# Patient Record
Sex: MALE | Race: WHITE | NOT HISPANIC OR LATINO | Employment: OTHER | URBAN - METROPOLITAN AREA
[De-identification: names, ages, dates, MRNs, and addresses within clinical notes are randomized per-mention and may not be internally consistent; named-entity substitution may affect disease eponyms.]

---

## 2023-04-04 ENCOUNTER — OFFICE VISIT (OUTPATIENT)
Dept: UROLOGY | Facility: CLINIC | Age: 73
End: 2023-04-04
Payer: MEDICARE

## 2023-04-04 VITALS
DIASTOLIC BLOOD PRESSURE: 69 MMHG | SYSTOLIC BLOOD PRESSURE: 146 MMHG | WEIGHT: 315 LBS | HEIGHT: 72 IN | HEART RATE: 109 BPM | BODY MASS INDEX: 42.66 KG/M2

## 2023-04-04 DIAGNOSIS — N28.1 RENAL CYST: Primary | ICD-10-CM

## 2023-04-04 DIAGNOSIS — N20.0 KIDNEY STONES: ICD-10-CM

## 2023-04-04 DIAGNOSIS — N39.0 RECURRENT UTI: ICD-10-CM

## 2023-04-04 LAB — POC RESIDUAL URINE VOLUME: 0 ML (ref 0–100)

## 2023-04-04 PROCEDURE — 1159F MED LIST DOCD IN RCRD: CPT | Mod: CPTII,S$GLB,, | Performed by: UROLOGY

## 2023-04-04 PROCEDURE — 3288F PR FALLS RISK ASSESSMENT DOCUMENTED: ICD-10-PCS | Mod: CPTII,S$GLB,, | Performed by: UROLOGY

## 2023-04-04 PROCEDURE — 1159F PR MEDICATION LIST DOCUMENTED IN MEDICAL RECORD: ICD-10-PCS | Mod: CPTII,S$GLB,, | Performed by: UROLOGY

## 2023-04-04 PROCEDURE — 99999 PR PBB SHADOW E&M-NEW PATIENT-LVL III: ICD-10-PCS | Mod: PBBFAC,,, | Performed by: UROLOGY

## 2023-04-04 PROCEDURE — 99204 OFFICE O/P NEW MOD 45 MIN: CPT | Mod: S$GLB,,, | Performed by: UROLOGY

## 2023-04-04 PROCEDURE — 3008F BODY MASS INDEX DOCD: CPT | Mod: CPTII,S$GLB,, | Performed by: UROLOGY

## 2023-04-04 PROCEDURE — 99999 PR PBB SHADOW E&M-NEW PATIENT-LVL III: CPT | Mod: PBBFAC,,, | Performed by: UROLOGY

## 2023-04-04 PROCEDURE — 51798 US URINE CAPACITY MEASURE: CPT | Mod: S$GLB,,, | Performed by: UROLOGY

## 2023-04-04 PROCEDURE — 1125F AMNT PAIN NOTED PAIN PRSNT: CPT | Mod: CPTII,S$GLB,, | Performed by: UROLOGY

## 2023-04-04 PROCEDURE — 1101F PR PT FALLS ASSESS DOC 0-1 FALLS W/OUT INJ PAST YR: ICD-10-PCS | Mod: CPTII,S$GLB,, | Performed by: UROLOGY

## 2023-04-04 PROCEDURE — 1101F PT FALLS ASSESS-DOCD LE1/YR: CPT | Mod: CPTII,S$GLB,, | Performed by: UROLOGY

## 2023-04-04 PROCEDURE — 1125F PR PAIN SEVERITY QUANTIFIED, PAIN PRESENT: ICD-10-PCS | Mod: CPTII,S$GLB,, | Performed by: UROLOGY

## 2023-04-04 PROCEDURE — 3078F PR MOST RECENT DIASTOLIC BLOOD PRESSURE < 80 MM HG: ICD-10-PCS | Mod: CPTII,S$GLB,, | Performed by: UROLOGY

## 2023-04-04 PROCEDURE — 99204 PR OFFICE/OUTPT VISIT, NEW, LEVL IV, 45-59 MIN: ICD-10-PCS | Mod: S$GLB,,, | Performed by: UROLOGY

## 2023-04-04 PROCEDURE — 4010F ACE/ARB THERAPY RXD/TAKEN: CPT | Mod: CPTII,S$GLB,, | Performed by: UROLOGY

## 2023-04-04 PROCEDURE — 1160F PR REVIEW ALL MEDS BY PRESCRIBER/CLIN PHARMACIST DOCUMENTED: ICD-10-PCS | Mod: CPTII,S$GLB,, | Performed by: UROLOGY

## 2023-04-04 PROCEDURE — 3288F FALL RISK ASSESSMENT DOCD: CPT | Mod: CPTII,S$GLB,, | Performed by: UROLOGY

## 2023-04-04 PROCEDURE — 3077F PR MOST RECENT SYSTOLIC BLOOD PRESSURE >= 140 MM HG: ICD-10-PCS | Mod: CPTII,S$GLB,, | Performed by: UROLOGY

## 2023-04-04 PROCEDURE — 4010F PR ACE/ARB THEARPY RXD/TAKEN: ICD-10-PCS | Mod: CPTII,S$GLB,, | Performed by: UROLOGY

## 2023-04-04 PROCEDURE — 51798 POCT BLADDER SCAN: ICD-10-PCS | Mod: S$GLB,,, | Performed by: UROLOGY

## 2023-04-04 PROCEDURE — 3077F SYST BP >= 140 MM HG: CPT | Mod: CPTII,S$GLB,, | Performed by: UROLOGY

## 2023-04-04 PROCEDURE — 1160F RVW MEDS BY RX/DR IN RCRD: CPT | Mod: CPTII,S$GLB,, | Performed by: UROLOGY

## 2023-04-04 PROCEDURE — 3078F DIAST BP <80 MM HG: CPT | Mod: CPTII,S$GLB,, | Performed by: UROLOGY

## 2023-04-04 PROCEDURE — 3008F PR BODY MASS INDEX (BMI) DOCUMENTED: ICD-10-PCS | Mod: CPTII,S$GLB,, | Performed by: UROLOGY

## 2023-04-04 RX ORDER — ACETAMINOPHEN 500 MG
500 TABLET ORAL
COMMUNITY

## 2023-04-04 RX ORDER — ASCORBIC ACID 500 MG
500 TABLET ORAL
COMMUNITY

## 2023-04-04 RX ORDER — DOCUSATE SODIUM 100 MG/1
CAPSULE, LIQUID FILLED ORAL
COMMUNITY
End: 2023-08-09 | Stop reason: SDUPTHER

## 2023-04-04 RX ORDER — METFORMIN HYDROCHLORIDE 1000 MG/1
1000 TABLET ORAL 2 TIMES DAILY
COMMUNITY
Start: 2023-02-02

## 2023-04-04 RX ORDER — WARFARIN 4 MG/1
1 TABLET ORAL DAILY
COMMUNITY
Start: 2022-07-11

## 2023-04-04 RX ORDER — ROSUVASTATIN CALCIUM 40 MG/1
40 TABLET, COATED ORAL
COMMUNITY

## 2023-04-04 RX ORDER — CETIRIZINE HYDROCHLORIDE 10 MG/1
1 CAPSULE, LIQUID FILLED ORAL
COMMUNITY

## 2023-04-04 RX ORDER — CLOBETASOL PROPIONATE 0.46 MG/ML
SOLUTION TOPICAL
COMMUNITY
Start: 2022-12-30

## 2023-04-04 RX ORDER — HYDROCORTISONE 25 MG/G
CREAM TOPICAL 2 TIMES DAILY PRN
COMMUNITY
Start: 2022-12-18

## 2023-04-04 RX ORDER — VITAMIN E 268 MG
400 CAPSULE ORAL
COMMUNITY

## 2023-04-04 RX ORDER — DULAGLUTIDE 3 MG/.5ML
INJECTION, SOLUTION SUBCUTANEOUS
COMMUNITY
Start: 2023-03-15

## 2023-04-04 RX ORDER — KETOCONAZOLE 20 MG/ML
SHAMPOO, SUSPENSION TOPICAL
COMMUNITY
Start: 2023-02-22

## 2023-04-04 RX ORDER — KETOCONAZOLE 20 MG/G
CREAM TOPICAL 2 TIMES DAILY
COMMUNITY
Start: 2022-12-13

## 2023-04-04 RX ORDER — TAMSULOSIN HYDROCHLORIDE 0.4 MG/1
CAPSULE ORAL
COMMUNITY
Start: 2022-05-23 | End: 2023-08-09 | Stop reason: SDUPTHER

## 2023-04-04 RX ORDER — KRILL/OM-3/DHA/EPA/PHOSPHO/AST 1000-230MG
1 CAPSULE ORAL 2 TIMES DAILY
COMMUNITY

## 2023-04-04 RX ORDER — ESCITALOPRAM OXALATE 10 MG/1
10 TABLET ORAL
COMMUNITY
Start: 2023-02-22

## 2023-04-04 NOTE — PROGRESS NOTES
Subjective:       Patient ID: Nas Salazar is a 72 y.o. male.    Chief Complaint: urinary incontinence     This is a 72 y.o.  male patient that is new to me.  The patient was self referred h/o renal cysts, UTIs.    Recently with severe frequency/incontinence, Urine culture from PCP showed C. Koseri, put on Augmentin, still symptoms but only 2 doses.  H/o UTIs in past and renal cyst per report, followed by OSH urologist in past, last imaging 4/2022 with renal US--right exophytic cyst no significant change 3.6 cm, left non obstructing kidney stone 7mm, KUB at time showed small stones.  H/o stones in past on allopurinol for uric acid stones.     H/o heart valve on coumadin. DM on trulicity and metformin.     3 UTIs since 2019 per report, no culture data.     LAST PSA  3/23--1.84    Lab Results   Component Value Date    CREATININE 0.8 04/07/2015       ---  PMH/PSH/Medications/Allergies/Social history reviewed and as in chart.    Review of Systems   Constitutional:  Negative for activity change, chills and fever.   HENT:  Negative for congestion.    Respiratory:  Negative for cough, chest tightness and shortness of breath.    Cardiovascular:  Negative for chest pain and palpitations.   Gastrointestinal:  Negative for abdominal distention, abdominal pain, nausea and vomiting.   Genitourinary:  Positive for dysuria, enuresis, frequency and urgency. Negative for difficulty urinating, flank pain, hematuria, penile pain, scrotal swelling and testicular pain.   Musculoskeletal:  Positive for back pain. Negative for gait problem.     Objective:      Physical Exam  HENT:      Head: Atraumatic.   Pulmonary:      Effort: Pulmonary effort is normal.   Neurological:      General: No focal deficit present.      Mental Status: He is alert and oriented to person, place, and time.       Assessment:     Problem Noted   Renal Cyst 4/4/2023    3.6 cm right renal cyst YARITZA 4/22 at [facility] [place]       Recurrent Uti 4/4/2023    3 UTIs since  2019  3/23 had C. Koseri       Kidney Stones 4/4/2023    On YARITZA 4/22 at [facility] [place], 7mm left           Plan:     YARITZA to evaluate cyst and cause of UTIs  Cystoscopy to complete UTI workup  Continue antibiotic  Follow up to review.     Allan Valentino MD    The above referenced imaging and interpretations were personally reviewed.  Disclaimer: This note has been generated using voice-recognition software. There may be typographical errors that have been missed during proof-reading.

## 2023-04-06 ENCOUNTER — PATIENT MESSAGE (OUTPATIENT)
Dept: UROLOGY | Facility: CLINIC | Age: 73
End: 2023-04-06
Payer: MEDICARE

## 2023-04-06 ENCOUNTER — TELEPHONE (OUTPATIENT)
Dept: UROLOGY | Facility: CLINIC | Age: 73
End: 2023-04-06
Payer: MEDICARE

## 2023-04-06 NOTE — TELEPHONE ENCOUNTER
----- Message from Theresa Kim sent at 4/6/2023 11:35 AM CDT -----  Pt Requesting Call Back/Returning call    Who called: Rich  pt's daughter in law  Who called for pt: LORENZO Michelle  Best call back #:  (pt's daughter in law)  Add notes:

## 2023-04-06 NOTE — TELEPHONE ENCOUNTER
I tired reaching out to the patient daughter (Rich) to understand the message that was sent but there was no answer.

## 2023-04-06 NOTE — TELEPHONE ENCOUNTER
I spoke with the patient daughter (Rich) regarding a medication (Augmentin) that was prescribed by the patient PCP. The daughter was told that  wanted the patient to continue the medication but the patient is having a fever of 99.6. The patient daughter wants to know if he should continue the medication. I voiced that I will forward this message to  to further assist.

## 2023-04-08 ENCOUNTER — PATIENT MESSAGE (OUTPATIENT)
Dept: UROLOGY | Facility: CLINIC | Age: 73
End: 2023-04-08
Payer: MEDICARE

## 2023-04-10 NOTE — TELEPHONE ENCOUNTER
I reached out to the patient daughter (Rich) to give her the date of the cystoscopy which is on 04/19 at 11:10 am. Patient daughter confirmed the appointment.

## 2023-04-13 ENCOUNTER — PATIENT MESSAGE (OUTPATIENT)
Dept: UROLOGY | Facility: CLINIC | Age: 73
End: 2023-04-13
Payer: MEDICARE

## 2023-04-13 ENCOUNTER — HOSPITAL ENCOUNTER (OUTPATIENT)
Dept: RADIOLOGY | Facility: HOSPITAL | Age: 73
Discharge: HOME | End: 2023-04-13
Attending: UROLOGY
Payer: MEDICARE

## 2023-04-13 DIAGNOSIS — N39.0 RECURRENT UTI: ICD-10-CM

## 2023-04-13 DIAGNOSIS — N28.1 RENAL CYST: ICD-10-CM

## 2023-04-13 DIAGNOSIS — N28.1 RENAL CYST: Primary | ICD-10-CM

## 2023-04-13 PROCEDURE — 76770 US RETROPERITONEAL COMPLETE: ICD-10-PCS | Mod: 26,,, | Performed by: RADIOLOGY

## 2023-04-13 PROCEDURE — 76770 US EXAM ABDO BACK WALL COMP: CPT | Mod: TC

## 2023-04-13 PROCEDURE — 76770 US EXAM ABDO BACK WALL COMP: CPT | Mod: 26,,, | Performed by: RADIOLOGY

## 2023-04-14 ENCOUNTER — LAB VISIT (OUTPATIENT)
Dept: LAB | Facility: HOSPITAL | Age: 73
End: 2023-04-14
Payer: MEDICARE

## 2023-04-14 DIAGNOSIS — N28.1 RENAL CYST: ICD-10-CM

## 2023-04-14 LAB
CREAT SERPL-MCNC: 1.1 MG/DL (ref 0.5–1.4)
EST. GFR  (NO RACE VARIABLE): >60 ML/MIN/1.73 M^2

## 2023-04-14 PROCEDURE — 82565 ASSAY OF CREATININE: CPT | Performed by: UROLOGY

## 2023-04-14 PROCEDURE — 36415 COLL VENOUS BLD VENIPUNCTURE: CPT | Mod: PN | Performed by: UROLOGY

## 2023-04-25 ENCOUNTER — HOSPITAL ENCOUNTER (OUTPATIENT)
Dept: RADIOLOGY | Facility: OTHER | Age: 73
Discharge: HOME | End: 2023-04-25
Attending: UROLOGY
Payer: MEDICARE

## 2023-04-25 DIAGNOSIS — N28.1 RENAL CYST: ICD-10-CM

## 2023-04-25 PROCEDURE — 74183 MRI ABD W/O CNTR FLWD CNTR: CPT | Mod: TC

## 2023-04-25 PROCEDURE — 74183 MRI ABD W/O CNTR FLWD CNTR: CPT | Mod: 26,,, | Performed by: RADIOLOGY

## 2023-04-25 PROCEDURE — 74183 MRI ABDOMEN W WO CONTRAST: ICD-10-PCS | Mod: 26,,, | Performed by: RADIOLOGY

## 2023-04-25 PROCEDURE — A9585 GADOBUTROL INJECTION: HCPCS | Performed by: UROLOGY

## 2023-04-25 PROCEDURE — 25500020 PHARM REV CODE 255: Performed by: UROLOGY

## 2023-04-25 RX ORDER — GADOBUTROL 604.72 MG/ML
10 INJECTION INTRAVENOUS
Status: COMPLETED | OUTPATIENT
Start: 2023-04-25 | End: 2023-04-25

## 2023-04-25 RX ADMIN — GADOBUTROL 10 ML: 604.72 INJECTION INTRAVENOUS at 08:04

## 2023-05-02 ENCOUNTER — PATIENT MESSAGE (OUTPATIENT)
Dept: UROLOGY | Facility: CLINIC | Age: 73
End: 2023-05-02
Payer: MEDICARE

## 2023-05-05 ENCOUNTER — PROCEDURE VISIT (OUTPATIENT)
Dept: UROLOGY | Facility: CLINIC | Age: 73
End: 2023-05-05
Payer: MEDICARE

## 2023-05-05 VITALS
WEIGHT: 315 LBS | HEART RATE: 72 BPM | BODY MASS INDEX: 42.66 KG/M2 | DIASTOLIC BLOOD PRESSURE: 74 MMHG | SYSTOLIC BLOOD PRESSURE: 144 MMHG | HEIGHT: 72 IN

## 2023-05-05 DIAGNOSIS — N28.1 RENAL CYST: ICD-10-CM

## 2023-05-05 DIAGNOSIS — N39.0 RECURRENT UTI: ICD-10-CM

## 2023-05-05 PROCEDURE — 52000 CYSTOSCOPY: ICD-10-PCS | Mod: S$GLB,,, | Performed by: UROLOGY

## 2023-05-05 PROCEDURE — 52000 CYSTOURETHROSCOPY: CPT | Mod: S$GLB,,, | Performed by: UROLOGY

## 2023-05-05 PROCEDURE — 99214 OFFICE O/P EST MOD 30 MIN: CPT | Mod: 25,S$GLB,, | Performed by: UROLOGY

## 2023-05-05 PROCEDURE — 99214 PR OFFICE/OUTPT VISIT, EST, LEVL IV, 30-39 MIN: ICD-10-PCS | Mod: 25,S$GLB,, | Performed by: UROLOGY

## 2023-05-05 RX ORDER — FERROUS GLUCONATE 324(38)MG
324 TABLET ORAL
COMMUNITY
End: 2023-08-09

## 2023-05-05 RX ORDER — DALTEPARIN SODIUM 12500 [IU]/.5ML
INJECTION SUBCUTANEOUS
COMMUNITY
Start: 2023-02-13 | End: 2023-08-09

## 2023-05-05 NOTE — PROCEDURES
Cystoscopy    Date/Time: 5/5/2023 8:45 AM  Performed by: Allan Valentino MD  Authorized by: Allan Valentino MD     Consent Done?:  Yes (Written)  Timeout: prior to procedure the correct patient, procedure, and site was verified    Prep: patient was prepped and draped in usual sterile fashion    Local anesthesia used?: Yes    Anesthesia:  Lidocaine jelly  Indications: recurrent UTIs    Position:  Supine  Anesthesia:  Lidocaine jelly  Preparation: Patient was prepped and draped in usual sterile fashion    Scope type:  Flexible cystoscope  External exam normal: No (phimosis able to retract but difficult)    Circumcised: No    Urethral Meatus Normal: No    Meatal stenosis: No    Hypospadius: No    Condyloma: No    Digital exam performed: No    Urethra normal: Yes    Prostate normal: Yes    Bladder neck normal: Yes    Bladder normal: Yes     patient tolerated the procedure well with no immediate complications

## 2023-05-05 NOTE — PROGRESS NOTES
Subjective:       Patient ID: Nas Salazar is a 72 y.o. male.    Chief Complaint: Procedure (cysto)     This is a 72 y.o.  male patient with complex right renal cyst, UTIs.    Recently with severe frequency/incontinence, Urine culture from PCP showed C. Koseri, put on Augmentin.   H/o UTIs in past and renal cyst per report, followed by OSH urologist in past, last imaging 4/2022 with renal US--right exophytic cyst no significant change 3.6 cm, left non obstructing kidney stone 7mm, KUB at time showed small stones.  H/o stones in past on allopurinol for uric acid stones.   Cyst followed for years.     H/o heart valve on coumadin. DM on trulicity and metformin.     3 UTIs since 2019 per report, no culture data.   YARITZA 4/23:  complex right cyst  MRI 4/23:  3.2 cm right complex cystic lesion with possible enhancing areas/septations, concerning for possible cystic RCC  Cystoscopy 5/5/23:  negative, phimosis    LAST PSA  3/23--1.84    Lab Results   Component Value Date    CREATININE 1.1 04/14/2023       ---  PMH/PSH/Medications/Allergies/Social history reviewed and as in chart.    Review of Systems   Constitutional:  Negative for activity change, chills and fever.   HENT:  Negative for congestion.    Respiratory:  Negative for cough, chest tightness and shortness of breath.    Cardiovascular:  Negative for chest pain and palpitations.   Gastrointestinal:  Negative for abdominal distention, abdominal pain, nausea and vomiting.   Genitourinary:  Positive for enuresis, frequency and urgency. Negative for difficulty urinating, dysuria, flank pain, hematuria, penile pain, scrotal swelling and testicular pain.   Musculoskeletal:  Positive for back pain. Negative for gait problem.     Objective:      Physical Exam  HENT:      Head: Atraumatic.   Pulmonary:      Effort: Pulmonary effort is normal.   Neurological:      General: No focal deficit present.      Mental Status: He is alert and oriented to person, place, and time.          YARITZA 4/23:    Impression:     Partial exophytic right renal lower pole lesion, potentially relating to complex cyst or solid mass.  Further correlation with renal protocol CT or MRI recommended.     Bilateral renal cysts.     Mildly elevated renal resistive indices, a nonspecific indicator of underlying medical renal disease.    Results for orders placed or performed during the hospital encounter of 04/25/23 (from the past 2160 hour(s))   MRI Abdomen W WO Contrast    Impression    Complex right renal mass with intrinsic T1 signal hyperintensity on precontrast imaging.  On subtraction images, I believe there is rim enhancement with thick enhancing septations inferiorly.  Close follow-up advised as a primary renal neoplasm remains a concern.  It may be worthwhile to perform the follow-up exam with CT renal protocol to assess for calcifications.    Bilateral renal cysts.      Electronically signed by: Dina Mcleod  Date:    04/25/2023  Time:    09:29       Assessment:     Problem Noted   Renal Cyst 4/4/2023    3.6 cm right renal cyst YARITZA 4/22 at [facility] [place]  Complex cyst on YARITZA 4/23  MRI 4/23: with possible peripheral wall calcifications vs enhancement, 3.2 cm       Recurrent Uti 4/4/2023    3 UTIs since 2019  3/23 had C. Koseri  Imaging under cysts  Cystoscopy 5/5/23: negative, moderate phimosis       Kidney Stones 4/4/2023    On YARITZA 4/22 at [facility] [place], 7mm left           Plan:     Cystoscopy reviewed  Separate from procedure discussed enuresis, frequency, recurrent UTIs.  Hold on anticholinergic or B3 agonist for now.  Timed voiding in day and prior to sleep.    If worsening symptoms will do straight cath urine specimen  Separately discussed renal cystic lesion, followed for years: discussed observation vs surgical removal.  Wish to observe.  Plan for CT A/P w/wo contrast in about 6 months (11/2023).  Follow up in 3 months for urinary issues.     Allan Valentino MD    The above referenced imaging and interpretations  were personally reviewed.  Disclaimer: This note has been generated using voice-recognition software. There may be typographical errors that have been missed during proof-reading.

## 2023-08-07 PROBLEM — N39.0 RECURRENT UTI: Status: RESOLVED | Noted: 2023-04-04 | Resolved: 2023-08-07

## 2023-08-08 NOTE — PROGRESS NOTES
Subjective:       Patient ID: Nas Salazar is a 73 y.o. male.    Chief Complaint: No chief complaint on file.     This is a 73 y.o.  male patient with complex right renal cyst, UTIs.    Recently with severe frequency/incontinence, Urine culture from PCP showed C. Koseri, put on Augmentin.   H/o UTIs in past and renal cyst per report, followed by OSH urologist in past, last imaging 4/2022 with renal US--right exophytic cyst no significant change 3.6 cm, left non obstructing kidney stone 7mm, KUB at time showed small stones.  H/o stones in past on allopurinol for uric acid stones.   Cyst followed for years.     H/o heart valve on coumadin. DM on trulicity and metformin.     3 UTIs since 2019 per report, no culture data. Last reportedly 4/2023.   YARITZA 4/23:  complex right cyst  MRI 4/23:  3.2 cm right complex cystic lesion with possible enhancing areas/septations, concerning for possible cystic RCC  Cystoscopy 5/5/23:  negative, phimosis  No UTIs since, still on tamsulosin, doing well.      LAST PSA  3/23--1.84    Lab Results   Component Value Date    CREATININE 1.1 04/14/2023       ---  PMH/PSH/Medications/Allergies/Social history reviewed and as in chart.    Review of Systems   Constitutional:  Negative for activity change, chills and fever.   HENT:  Negative for congestion.    Respiratory:  Negative for cough, chest tightness and shortness of breath.    Cardiovascular:  Negative for chest pain and palpitations.   Gastrointestinal:  Negative for abdominal distention, abdominal pain, nausea and vomiting.   Genitourinary:  Negative for difficulty urinating, dysuria, enuresis, flank pain, frequency, hematuria, penile pain, scrotal swelling, testicular pain and urgency.   Musculoskeletal:  Negative for back pain and gait problem.       Objective:      Physical Exam  HENT:      Head: Atraumatic.   Pulmonary:      Effort: Pulmonary effort is normal.   Neurological:      General: No focal deficit present.      Mental  Status: He is alert and oriented to person, place, and time.           YARITZA 4/23:    Impression:     Partial exophytic right renal lower pole lesion, potentially relating to complex cyst or solid mass.  Further correlation with renal protocol CT or MRI recommended.     Bilateral renal cysts.     Mildly elevated renal resistive indices, a nonspecific indicator of underlying medical renal disease.    No results found for this or any previous visit (from the past 2160 hour(s)).      Assessment:     Problem Noted   Renal Cyst 4/4/2023    3.6 [place] [facility] [place] cyst on YARITZA 4/23  MRI 4/23: with possible peripheral wall calcifications vs enhancement, 3.2 cm     Kidney Stones 4/4/2023    [place] [facility] [place]     Recurrent Uti (Resolved) 4/4/2023    3 UTIs since 2019  3/23 had C. Koseri  Imaging under cysts  Cystoscopy 5/5/23: negative, moderate phimosis         Plan:     Refilled tamsulosin, no further UTIs.  Continue medication  Follow up in 1 year with renal US for cyst, sooner if needed.     Allan Valentino MD    The above referenced imaging and interpretations were personally reviewed.  Disclaimer: This note has been generated using voice-recognition software. There may be typographical errors that have been missed during proof-reading.

## 2023-08-09 ENCOUNTER — OFFICE VISIT (OUTPATIENT)
Dept: UROLOGY | Facility: CLINIC | Age: 73
End: 2023-08-09
Payer: MEDICARE

## 2023-08-09 VITALS — SYSTOLIC BLOOD PRESSURE: 138 MMHG | DIASTOLIC BLOOD PRESSURE: 73 MMHG | HEART RATE: 65 BPM

## 2023-08-09 DIAGNOSIS — N39.0 RECURRENT UTI: Primary | ICD-10-CM

## 2023-08-09 DIAGNOSIS — N28.1 RENAL CYST: ICD-10-CM

## 2023-08-09 PROCEDURE — 4010F PR ACE/ARB THEARPY RXD/TAKEN: ICD-10-PCS | Mod: CPTII,S$GLB,, | Performed by: UROLOGY

## 2023-08-09 PROCEDURE — 1159F MED LIST DOCD IN RCRD: CPT | Mod: CPTII,S$GLB,, | Performed by: UROLOGY

## 2023-08-09 PROCEDURE — 3078F DIAST BP <80 MM HG: CPT | Mod: CPTII,S$GLB,, | Performed by: UROLOGY

## 2023-08-09 PROCEDURE — 99214 PR OFFICE/OUTPT VISIT, EST, LEVL IV, 30-39 MIN: ICD-10-PCS | Mod: S$GLB,,, | Performed by: UROLOGY

## 2023-08-09 PROCEDURE — 1160F PR REVIEW ALL MEDS BY PRESCRIBER/CLIN PHARMACIST DOCUMENTED: ICD-10-PCS | Mod: CPTII,S$GLB,, | Performed by: UROLOGY

## 2023-08-09 PROCEDURE — 1160F RVW MEDS BY RX/DR IN RCRD: CPT | Mod: CPTII,S$GLB,, | Performed by: UROLOGY

## 2023-08-09 PROCEDURE — 3075F PR MOST RECENT SYSTOLIC BLOOD PRESS GE 130-139MM HG: ICD-10-PCS | Mod: CPTII,S$GLB,, | Performed by: UROLOGY

## 2023-08-09 PROCEDURE — 1159F PR MEDICATION LIST DOCUMENTED IN MEDICAL RECORD: ICD-10-PCS | Mod: CPTII,S$GLB,, | Performed by: UROLOGY

## 2023-08-09 PROCEDURE — 99999 PR PBB SHADOW E&M-EST. PATIENT-LVL IV: ICD-10-PCS | Mod: PBBFAC,,, | Performed by: UROLOGY

## 2023-08-09 PROCEDURE — 3075F SYST BP GE 130 - 139MM HG: CPT | Mod: CPTII,S$GLB,, | Performed by: UROLOGY

## 2023-08-09 PROCEDURE — 99999 PR PBB SHADOW E&M-EST. PATIENT-LVL IV: CPT | Mod: PBBFAC,,, | Performed by: UROLOGY

## 2023-08-09 PROCEDURE — 1126F AMNT PAIN NOTED NONE PRSNT: CPT | Mod: CPTII,S$GLB,, | Performed by: UROLOGY

## 2023-08-09 PROCEDURE — 4010F ACE/ARB THERAPY RXD/TAKEN: CPT | Mod: CPTII,S$GLB,, | Performed by: UROLOGY

## 2023-08-09 PROCEDURE — 99214 OFFICE O/P EST MOD 30 MIN: CPT | Mod: S$GLB,,, | Performed by: UROLOGY

## 2023-08-09 PROCEDURE — 1126F PR PAIN SEVERITY QUANTIFIED, NO PAIN PRESENT: ICD-10-PCS | Mod: CPTII,S$GLB,, | Performed by: UROLOGY

## 2023-08-09 PROCEDURE — 3078F PR MOST RECENT DIASTOLIC BLOOD PRESSURE < 80 MM HG: ICD-10-PCS | Mod: CPTII,S$GLB,, | Performed by: UROLOGY

## 2023-08-09 RX ORDER — TAMSULOSIN HYDROCHLORIDE 0.4 MG/1
0.4 CAPSULE ORAL DAILY
Qty: 90 CAPSULE | Refills: 3 | Status: SHIPPED | OUTPATIENT
Start: 2023-08-09 | End: 2024-08-08

## 2024-07-09 DIAGNOSIS — N28.1 RENAL CYST: ICD-10-CM

## 2024-07-09 DIAGNOSIS — N39.0 RECURRENT UTI: ICD-10-CM

## 2024-07-09 RX ORDER — TAMSULOSIN HYDROCHLORIDE 0.4 MG/1
0.4 CAPSULE ORAL DAILY
Qty: 90 CAPSULE | Refills: 3 | Status: SHIPPED | OUTPATIENT
Start: 2024-07-09 | End: 2025-07-09

## 2024-08-13 ENCOUNTER — HOSPITAL ENCOUNTER (OUTPATIENT)
Dept: RADIOLOGY | Facility: HOSPITAL | Age: 74
Discharge: HOME | End: 2024-08-13
Attending: UROLOGY
Payer: MEDICARE

## 2024-08-13 DIAGNOSIS — N28.1 RENAL CYST: ICD-10-CM

## 2024-08-13 PROCEDURE — 76770 US EXAM ABDO BACK WALL COMP: CPT | Mod: TC

## 2024-08-13 PROCEDURE — 76770 US EXAM ABDO BACK WALL COMP: CPT | Mod: 26,,, | Performed by: RADIOLOGY

## 2024-08-19 NOTE — PROGRESS NOTES
Subjective:       Patient ID: Nas Salazar is a 74 y.o. male.    Chief Complaint: No chief complaint on file.     This is a 74 y.o.  male patient with complex right renal cyst, UTIs.    Recently with severe frequency/incontinence, Urine culture from PCP showed C. Koseri, put on Augmentin.   H/o UTIs in past and renal cyst per report, followed by OSH urologist in past, last imaging 4/2022 with renal US--right exophytic cyst no significant change 3.6 cm, left non obstructing kidney stone 7mm, KUB at time showed small stones.  H/o stones in past on allopurinol for uric acid stones.   Cyst followed for years.     H/o heart valve on coumadin. DM on trulicity and metformin.     3 UTIs since 2019 per report, no culture data. Last reportedly 4/2023.   YARITZA 4/23:  complex right cyst  MRI 4/23:  3.2 cm right complex cystic lesion with possible enhancing areas/septations, concerning for possible cystic RCC  Cystoscopy 5/5/23:  negative, phimosis  No UTIs since, still on tamsulosin, doing well.    YARITZA 8/2024:  slight decrease in right complex cyst now 2.7 cm; left non obstructing stones.       LAST PSA  3/23--1.84    Lab Results   Component Value Date    CREATININE 1.1 04/14/2023       ---  PMH/PSH/Medications/Allergies/Social history reviewed and as in chart.    Review of Systems   Constitutional:  Negative for activity change, chills and fever.   HENT:  Negative for congestion.    Respiratory:  Negative for cough, chest tightness and shortness of breath.    Cardiovascular:  Negative for chest pain and palpitations.   Gastrointestinal:  Negative for abdominal distention, abdominal pain, nausea and vomiting.   Genitourinary:  Negative for difficulty urinating, dysuria, enuresis, flank pain, frequency, hematuria, penile pain, scrotal swelling, testicular pain and urgency.   Musculoskeletal:  Negative for back pain and gait problem.       Objective:      Physical Exam  HENT:      Head: Atraumatic.   Pulmonary:      Effort:  Pulmonary effort is normal.   Neurological:      General: No focal deficit present.      Mental Status: He is alert and oriented to person, place, and time.           YARITZA 4/23:    Impression:     Partial exophytic right renal lower pole lesion, potentially relating to complex cyst or solid mass.  Further correlation with renal protocol CT or MRI recommended.     Bilateral renal cysts.     Mildly elevated renal resistive indices, a nonspecific indicator of underlying medical renal disease.          YARITZA 8/2024:    Impression:     Slight interval decrease in size of right renal exophytic lesion.  Appearance remains nonspecific, may represent complex hemorrhagic or proteinaceous cyst, however solid mass cannot be excluded.  Better characterized on prior MRI 04/25/2023.  Continued follow-up with ultrasound or MRI recommended.     Left nonobstructive renal calculus.     Bilateral renal cysts.    Assessment:     Problem Noted   Renal Cyst 4/4/2023    3.6 cm right renal [place] YARITZA 4[place]22 [place] [facility] [place]  Complex cyst on YARITZA 4/23  MRI 4/23: with possible peripheral wall calcifications vs enhancement, 3.2 cm     Kidney Stones 4/4/2023    On YARITZA 4/22 at [facility] [place], 7mm left     Recurrent Uti (Resolved) 4/4/2023    3 UTIs since 2019  3/23 had C. Koseri  Imaging under cysts  Cystoscopy 5/5/23: negative, moderate phimosis         Plan:     YARITZA reviewed, slight decrease in cystic lesion  Refilled tamsulosin  Follow up in 6 months with YARITZA prior     Allan Valentino MD    The above referenced imaging and interpretations were personally reviewed.  Disclaimer: This note has been generated using voice-recognition software. There may be typographical errors that have been missed during proof-reading.

## 2024-08-20 ENCOUNTER — OFFICE VISIT (OUTPATIENT)
Dept: UROLOGY | Facility: CLINIC | Age: 74
End: 2024-08-20
Payer: MEDICARE

## 2024-08-20 VITALS
DIASTOLIC BLOOD PRESSURE: 71 MMHG | HEIGHT: 72 IN | SYSTOLIC BLOOD PRESSURE: 131 MMHG | HEART RATE: 79 BPM | BODY MASS INDEX: 43.03 KG/M2

## 2024-08-20 DIAGNOSIS — N28.1 RENAL CYST: Primary | ICD-10-CM

## 2024-08-20 DIAGNOSIS — N39.0 RECURRENT UTI: ICD-10-CM

## 2024-08-20 PROCEDURE — 1101F PT FALLS ASSESS-DOCD LE1/YR: CPT | Mod: CPTII,S$GLB,, | Performed by: UROLOGY

## 2024-08-20 PROCEDURE — 99214 OFFICE O/P EST MOD 30 MIN: CPT | Mod: S$GLB,,, | Performed by: UROLOGY

## 2024-08-20 PROCEDURE — 3075F SYST BP GE 130 - 139MM HG: CPT | Mod: CPTII,S$GLB,, | Performed by: UROLOGY

## 2024-08-20 PROCEDURE — 3078F DIAST BP <80 MM HG: CPT | Mod: CPTII,S$GLB,, | Performed by: UROLOGY

## 2024-08-20 PROCEDURE — 99999 PR PBB SHADOW E&M-EST. PATIENT-LVL IV: CPT | Mod: PBBFAC,,, | Performed by: UROLOGY

## 2024-08-20 PROCEDURE — 3288F FALL RISK ASSESSMENT DOCD: CPT | Mod: CPTII,S$GLB,, | Performed by: UROLOGY

## 2024-08-20 PROCEDURE — 4010F ACE/ARB THERAPY RXD/TAKEN: CPT | Mod: CPTII,S$GLB,, | Performed by: UROLOGY

## 2024-08-20 PROCEDURE — 1126F AMNT PAIN NOTED NONE PRSNT: CPT | Mod: CPTII,S$GLB,, | Performed by: UROLOGY

## 2024-08-20 PROCEDURE — 3008F BODY MASS INDEX DOCD: CPT | Mod: CPTII,S$GLB,, | Performed by: UROLOGY

## 2024-08-20 PROCEDURE — 1159F MED LIST DOCD IN RCRD: CPT | Mod: CPTII,S$GLB,, | Performed by: UROLOGY

## 2024-08-20 PROCEDURE — 1160F RVW MEDS BY RX/DR IN RCRD: CPT | Mod: CPTII,S$GLB,, | Performed by: UROLOGY

## 2024-08-20 RX ORDER — EMPAGLIFLOZIN 10 MG/1
10 TABLET, FILM COATED ORAL
COMMUNITY

## 2024-08-20 RX ORDER — DOCUSATE SODIUM 100 MG/1
1 CAPSULE, LIQUID FILLED ORAL 2 TIMES DAILY
COMMUNITY

## 2024-08-20 RX ORDER — DULAGLUTIDE 4.5 MG/.5ML
INJECTION, SOLUTION SUBCUTANEOUS
COMMUNITY
Start: 2023-11-24

## 2024-08-20 RX ORDER — TAMSULOSIN HYDROCHLORIDE 0.4 MG/1
0.4 CAPSULE ORAL DAILY
Qty: 90 CAPSULE | Refills: 3 | Status: SHIPPED | OUTPATIENT
Start: 2024-08-20 | End: 2025-08-20

## 2025-02-25 ENCOUNTER — HOSPITAL ENCOUNTER (OUTPATIENT)
Dept: RADIOLOGY | Facility: HOSPITAL | Age: 75
Discharge: HOME | End: 2025-02-25
Attending: UROLOGY
Payer: MEDICARE

## 2025-02-25 DIAGNOSIS — N28.1 RENAL CYST: ICD-10-CM

## 2025-02-25 PROCEDURE — 76770 US EXAM ABDO BACK WALL COMP: CPT | Mod: TC

## 2025-02-25 PROCEDURE — 76770 US EXAM ABDO BACK WALL COMP: CPT | Mod: 26,,, | Performed by: INTERNAL MEDICINE
